# Patient Record
Sex: FEMALE | Race: AMERICAN INDIAN OR ALASKA NATIVE
[De-identification: names, ages, dates, MRNs, and addresses within clinical notes are randomized per-mention and may not be internally consistent; named-entity substitution may affect disease eponyms.]

---

## 2020-09-23 ENCOUNTER — HOSPITAL ENCOUNTER (OUTPATIENT)
Dept: HOSPITAL 5 - OR | Age: 55
Discharge: HOME | End: 2020-09-23
Attending: SURGERY
Payer: COMMERCIAL

## 2020-09-23 VITALS — SYSTOLIC BLOOD PRESSURE: 148 MMHG | DIASTOLIC BLOOD PRESSURE: 92 MMHG

## 2020-09-23 DIAGNOSIS — N63.24: ICD-10-CM

## 2020-09-23 DIAGNOSIS — E78.00: ICD-10-CM

## 2020-09-23 DIAGNOSIS — E11.42: ICD-10-CM

## 2020-09-23 DIAGNOSIS — Z88.8: ICD-10-CM

## 2020-09-23 DIAGNOSIS — Z20.828: ICD-10-CM

## 2020-09-23 DIAGNOSIS — Z98.891: ICD-10-CM

## 2020-09-23 DIAGNOSIS — Z98.890: ICD-10-CM

## 2020-09-23 DIAGNOSIS — J44.9: ICD-10-CM

## 2020-09-23 DIAGNOSIS — N60.02: Primary | ICD-10-CM

## 2020-09-23 DIAGNOSIS — F17.210: ICD-10-CM

## 2020-09-23 DIAGNOSIS — Z79.899: ICD-10-CM

## 2020-09-23 DIAGNOSIS — G62.9: ICD-10-CM

## 2020-09-23 DIAGNOSIS — E66.9: ICD-10-CM

## 2020-09-23 DIAGNOSIS — I10: ICD-10-CM

## 2020-09-23 DIAGNOSIS — L72.0: ICD-10-CM

## 2020-09-23 DIAGNOSIS — Z79.4: ICD-10-CM

## 2020-09-23 DIAGNOSIS — G47.30: ICD-10-CM

## 2020-09-23 DIAGNOSIS — Z86.73: ICD-10-CM

## 2020-09-23 DIAGNOSIS — M19.90: ICD-10-CM

## 2020-09-23 PROCEDURE — 88304 TISSUE EXAM BY PATHOLOGIST: CPT

## 2020-09-23 PROCEDURE — 82962 GLUCOSE BLOOD TEST: CPT

## 2020-09-23 PROCEDURE — 19120 REMOVAL OF BREAST LESION: CPT

## 2020-09-23 NOTE — OPERATIVE REPORT
Operative Report


Operative Report: 





Operative Report: 





September 23, 2020





Preoperative diagnosis: Left breast sebaceous cyst of the lower inner quadrant





Postoperative diagnosis: Same





Procedure: Ultrasound guided left breast sebaceous cyst excision





Surgeon: Jelly Monroe MD





Assistant: WATSON Nguyen MD





Anesthesia: General





Findings: Left breast sebaceous cyst at 6/7:00 at IMF





Complications: None





EBL: Minimal





Disposition: PACU in good condition





Indications for operative procedure: This is a 54 year old lady with left breast

sebaceous cyst at 6/7:00 position at inframammary fold. Patient with chronic 

history of multiple infections and recently treated 2 months ago. 

Recommendations were to proceed with an excision given chronicity of infection 

and she requested removal as well. Risks of infection discussed as well given 

prior inflammed tissues. She wished to proceed with the above procedure.





Procedure in detail: Patient was then taken to the operating room.  Gen. 

anesthesia was administered. Left breast and axilla were prepped and draped in 

the normal sterile operative fashion.  Left breast at known sebaceous cyst 6/7 

o'clock position at inframammary fold with 2 areas of [id number] tissue present 

from prior recent infection.  Ultrasound was used as well to identify the area 

of palpable sebaceous cyst.  A skin incision was made at the location of the 

granulated tissue encompassed in the excision with a 15 blade knife and 

dissection taken down to the subcutaneous tissues.  Then proceeded raising of 

the inferior flap and removal of the sebaceous cyst from the underlying skin. 

The sebaceous cyst was removed in its entirety with the capsule remaining 

intact.  A portion of the capsule was noted to be ruptured from prior infection 

medially.  Dissection was taken down to healthy non appearing infected breast 

tissue and then was removed posteriorly with the aid of the Bovie cautery.  Tosha

ast cavity wound area was irrigated with antibiotic solution.  Hemostasis was 

obtained and then noted.  The deep tissues were approximated and closed using 

interrupted 3-0 Vicryl followed by closing of the subcutaneous tissues with 

interrupted 3-0 Vicryl and then closing of the skin with a running 4-0 Monocryl 

followed by Dermabond.  2 small areas of prior drainage sites from infection 

were closed using one interrupted 4-0 Monocryl of a subcuticular stitch.  The 

patient tolerated surgery very well and she was awaken from anesthesia without 

any complication and transported to PACU in good condition.

## 2020-09-23 NOTE — ANESTHESIA CONSULTATION
Anesthesia Consult and Med Hx


Date of service: 09/23/20





- Airway


Anesthetic Teeth Evaluation: Poor, Caps, Crowns


ROM Head & Neck: Adequate


Mental/Hyoid Distance: Adequate


Mallampati Class: Class II


Intubation Access Assessment: Probably Good





- Pulmonary Exam


CTA: Yes





- Cardiac Exam


Cardiac Exam: RRR





- Pre-Operative Health Status


ASA Pre-Surgery Classification: ASA3


Proposed Anesthetic Plan: General





- Pulmonary


Hx Smoking: Yes (25pk yr hx)


Hx Respiratory Symptoms: No


COPD: Yes (used scheduled inhalers today)


Hx Sleep Apnea: Yes





- Cardiovascular System


Hx Hypertension: Yes (took lisinopril this morning)


Hx Heart Attack/AMI: No


Hx Percutaneous Transluminal Coronary Angioplasty (PTCA): No


Hx Cardia Arrhythmia: No





- Central Nervous System


CVA: Yes (TIA 2015)





- Endocrine


Hx Renal Disease: No (normal crt on recent outpatient labs)


Hx Liver Disease: No


Hx Insulin Dependent Diabetes: Yes


Hx Thyroid Disease: No





- Hematic


Hx Anemia: No (H/H 12/37 on recent outpatient labs)





- Other Systems


Hx Obesity: Yes (BMI 32)





- Additional Comments


Anesthesia Medical History Comments: No hx anesthetic complications.

## 2020-09-23 NOTE — SHORT STAY SUMMARY
Short Stay Documentation


Date of service: 09/23/20





- History


H&P: obtained from office





- Allergies and Medications


Current Medications: 


                                    Allergies





Fish Containing Products Allergy (Verified 09/16/20 11:25)


   Anaphylaxis


hydrocodone [From Vicodin] Allergy (Verified 09/16/20 11:25)


   Anaphylaxis





                                Home Medications











 Medication  Instructions  Recorded  Confirmed  Last Taken  Type


 


Albuterol Sulfate [Proair 90 mcg IH BID 09/16/20 09/23/20 09/23/20 05:45 History





Respiclick]     


 


Insulin Aspart Protam & Aspart 23 units SQ QPM 09/16/20 09/23/20 09/22/20 19:45 

History





[NovoLOG Mix 70-30 Flexpen]     


 


Insulin Aspart Protam & Aspart 30 units SQ QAM 09/16/20 09/16/20 09/22/20 

History





[NovoLOG Mix 70-30 Flexpen]     


 


Metformin HCl [metFORMIN] 1,000 mg PO BID 09/16/20 09/16/20 09/22/20 History


 


Rosuvastatin Calcium [Crestor] 10 mg PO DAILY 09/16/20 09/16/20 09/22/20 History


 


Tiotropium [Spiriva] 18 mcg IH QDAY 09/16/20 09/23/20 09/23/20 05:45 History


 


Triamcinolone 0.1% [Kenalog 0.1% 1 applic TP TID 09/16/20 09/16/20 09/22/20 

History





OINT]     


 


lisinopriL [Zestril TAB] 10 mg PO QDAY 09/16/20 09/23/20 09/23/20 05:45 History


 


Sulfamethoxazole/Trimethoprim 1 each PO BID #20 tablet 09/23/20  Unknown Rx





[Bactrim DS TAB]     


 


traMADoL [Ultram 50 MG tab] 50 mg PO Q6HR PRN #20 tablet 09/23/20  Unknown Rx








Active Medications





Celecoxib (Celebrex)  200 mg PO PREOP NR


   Stop: 09/23/20 23:00


   Last Admin: 09/23/20 07:25 Dose:  200 mg


   Documented by: 


Gabapentin (Gabapentin)  300 mg PO PREOP NR


   Stop: 09/23/20 23:00


   Last Admin: 09/23/20 07:25 Dose:  300 mg


   Documented by: 


Hydromorphone HCl (Dilaudid)  0.5 mg IV Q10MIN PRN


   PRN Reason: Pain , Severe (7-10)


Cefazolin Sodium (Ancef/Sterile Water 2 Gm/20 Ml)  2 gm in 20 mls @ 80 mls/hr IV

PREOP NR; Protocol


   Stop: 09/23/20 21:00


Lactated Ringer's (Lactated Ringers)  1,000 mls @ 100 mls/hr IV AS DIRECT ERINN


   Stop: 09/23/20 23:59


   Last Admin: 09/23/20 07:19 Dose:  100 mls/hr


   Documented by: 


Midazolam HCl (Versed)  2 mg IV PREOP NR


   Stop: 09/23/20 23:00


   Last Admin: 09/23/20 07:44 Dose:  2 mg


   Documented by: 











- Brief post op/procedure progress note


Date of procedure: 09/23/20


Pre-op diagnosis: Left breast sebaceous cyst


Post-op diagnosis: same


Procedure: 





Left breast sebaceous cyst excisional biopsy


Anesthesia: GETA


Findings: 





Left breast sebaceous cyst at 6/7:00 position


Surgeon: BAMBI SANTIAGO


Estimated blood loss: minimal


Pathology: list


Specimen disposition: to lab


Condition: stable





- Disposition


Condition at discharge: Good


Disposition: [id number] TO HOME OR SELFCARE





Short Stay Discharge Plan


Activity: other (no heavy lifting)


Diet: regular


Wound: keep clean and dry (remove dressing on Thursday; may shower in 48 hours; 

no baths)


Follow up with: 


BAMBI SANTIAGO MD [Staff Physician] - 7 Days


Prescriptions: 


Sulfamethoxazole/Trimethoprim [Bactrim DS TAB] 1 each PO BID #20 tablet


traMADoL [Ultram 50 MG tab] 50 mg PO Q6HR PRN #20 tablet


 PRN Reason: Pain